# Patient Record
Sex: MALE | Race: BLACK OR AFRICAN AMERICAN | Employment: FULL TIME | URBAN - METROPOLITAN AREA
[De-identification: names, ages, dates, MRNs, and addresses within clinical notes are randomized per-mention and may not be internally consistent; named-entity substitution may affect disease eponyms.]

---

## 2018-01-17 ENCOUNTER — HOSPITAL ENCOUNTER (EMERGENCY)
Age: 48
Discharge: HOME | End: 2018-01-17
Attending: EMERGENCY MEDICINE
Payer: SELF-PAY

## 2018-01-17 VITALS
HEIGHT: 67 IN | OXYGEN SATURATION: 100 % | WEIGHT: 165 LBS | DIASTOLIC BLOOD PRESSURE: 81 MMHG | RESPIRATION RATE: 14 BRPM | TEMPERATURE: 98.3 F | BODY MASS INDEX: 25.9 KG/M2 | SYSTOLIC BLOOD PRESSURE: 173 MMHG | HEART RATE: 91 BPM

## 2018-01-17 DIAGNOSIS — R11.2 NON-INTRACTABLE VOMITING WITH NAUSEA, UNSPECIFIED VOMITING TYPE: Primary | ICD-10-CM

## 2018-01-17 DIAGNOSIS — E87.3 ALKALOSIS: ICD-10-CM

## 2018-01-17 DIAGNOSIS — R73.9 HYPERGLYCEMIA: ICD-10-CM

## 2018-01-17 LAB
ALBUMIN SERPL-MCNC: 3.3 G/DL (ref 3.4–5)
ALBUMIN/GLOB SERPL: 0.9 {RATIO} (ref 0.8–1.7)
ALP SERPL-CCNC: 141 U/L (ref 45–117)
ALT SERPL-CCNC: 22 U/L (ref 16–61)
ANION GAP SERPL CALC-SCNC: 9 MMOL/L (ref 3–18)
AST SERPL-CCNC: 16 U/L (ref 15–37)
BASOPHILS # BLD: 0 K/UL (ref 0–0.06)
BASOPHILS NFR BLD: 1 % (ref 0–2)
BILIRUB SERPL-MCNC: 0.5 MG/DL (ref 0.2–1)
BUN SERPL-MCNC: 30 MG/DL (ref 7–18)
BUN/CREAT SERPL: 16 (ref 12–20)
CALCIUM SERPL-MCNC: 9.1 MG/DL (ref 8.5–10.1)
CHLORIDE SERPL-SCNC: 105 MMOL/L (ref 100–108)
CO2 SERPL-SCNC: 26 MMOL/L (ref 21–32)
CREAT SERPL-MCNC: 1.86 MG/DL (ref 0.6–1.3)
DIFFERENTIAL METHOD BLD: ABNORMAL
EOSINOPHIL # BLD: 0 K/UL (ref 0–0.4)
EOSINOPHIL NFR BLD: 0 % (ref 0–5)
ERYTHROCYTE [DISTWIDTH] IN BLOOD BY AUTOMATED COUNT: 13 % (ref 11.6–14.5)
GLOBULIN SER CALC-MCNC: 3.8 G/DL (ref 2–4)
GLUCOSE SERPL-MCNC: 294 MG/DL (ref 74–99)
HCT VFR BLD AUTO: 38.5 % (ref 36–48)
HGB BLD-MCNC: 13.6 G/DL (ref 13–16)
LIPASE SERPL-CCNC: 105 U/L (ref 73–393)
LYMPHOCYTES # BLD: 1.4 K/UL (ref 0.9–3.6)
LYMPHOCYTES NFR BLD: 25 % (ref 21–52)
MAGNESIUM SERPL-MCNC: 2 MG/DL (ref 1.6–2.6)
MCH RBC QN AUTO: 29.2 PG (ref 24–34)
MCHC RBC AUTO-ENTMCNC: 35.3 G/DL (ref 31–37)
MCV RBC AUTO: 82.6 FL (ref 74–97)
MONOCYTES # BLD: 0.4 K/UL (ref 0.05–1.2)
MONOCYTES NFR BLD: 8 % (ref 3–10)
NEUTS SEG # BLD: 3.7 K/UL (ref 1.8–8)
NEUTS SEG NFR BLD: 66 % (ref 40–73)
PH BLDV: 7.51 [PH] (ref 7.32–7.42)
PLATELET # BLD AUTO: 293 K/UL (ref 135–420)
PMV BLD AUTO: 10.4 FL (ref 9.2–11.8)
POTASSIUM SERPL-SCNC: 4.6 MMOL/L (ref 3.5–5.5)
PROT SERPL-MCNC: 7.1 G/DL (ref 6.4–8.2)
RBC # BLD AUTO: 4.66 M/UL (ref 4.7–5.5)
SODIUM SERPL-SCNC: 140 MMOL/L (ref 136–145)
WBC # BLD AUTO: 5.6 K/UL (ref 4.6–13.2)

## 2018-01-17 PROCEDURE — 99284 EMERGENCY DEPT VISIT MOD MDM: CPT

## 2018-01-17 PROCEDURE — 80053 COMPREHEN METABOLIC PANEL: CPT

## 2018-01-17 PROCEDURE — 96361 HYDRATE IV INFUSION ADD-ON: CPT

## 2018-01-17 PROCEDURE — 83735 ASSAY OF MAGNESIUM: CPT

## 2018-01-17 PROCEDURE — 74011250636 HC RX REV CODE- 250/636: Performed by: EMERGENCY MEDICINE

## 2018-01-17 PROCEDURE — 82800 BLOOD PH: CPT

## 2018-01-17 PROCEDURE — 96374 THER/PROPH/DIAG INJ IV PUSH: CPT

## 2018-01-17 PROCEDURE — 83690 ASSAY OF LIPASE: CPT

## 2018-01-17 PROCEDURE — 85025 COMPLETE CBC W/AUTO DIFF WBC: CPT

## 2018-01-17 RX ORDER — ONDANSETRON 4 MG/1
TABLET, ORALLY DISINTEGRATING ORAL
Qty: 12 TAB | Refills: 0 | Status: SHIPPED | OUTPATIENT
Start: 2018-01-17 | End: 2018-01-18

## 2018-01-17 RX ORDER — ONDANSETRON 2 MG/ML
4 INJECTION INTRAMUSCULAR; INTRAVENOUS
Status: COMPLETED | OUTPATIENT
Start: 2018-01-17 | End: 2018-01-17

## 2018-01-17 RX ORDER — INSULIN GLARGINE 100 [IU]/ML
22 INJECTION, SOLUTION SUBCUTANEOUS
COMMUNITY

## 2018-01-17 RX ORDER — METFORMIN HYDROCHLORIDE 1000 MG/1
1000 TABLET ORAL 2 TIMES DAILY WITH MEALS
COMMUNITY

## 2018-01-17 RX ADMIN — ONDANSETRON 4 MG: 2 INJECTION INTRAMUSCULAR; INTRAVENOUS at 10:58

## 2018-01-17 RX ADMIN — SODIUM CHLORIDE 1000 ML: 900 INJECTION, SOLUTION INTRAVENOUS at 09:48

## 2018-01-17 NOTE — ED PROVIDER NOTES
EMERGENCY DEPARTMENT HISTORY AND PHYSICAL EXAM    9:13 AM      Date: 1/17/2018  Patient Name: Megan Rodriguez    History of Presenting Illness     Chief Complaint   Patient presents with    Vomiting         History Provided By: Patient    Chief Complaint: Vomit  Duration: 2 Days  Timing:  Intermittent  Location: Abdomen  Quality: N/A  Severity: Moderate  Modifying Factors: worse after attempting to eat or drink  Associated Symptoms: fatigue, nausea      Additional History (Context): Megan Rodriguez is a 56 y.o. male with NIDDM, HTN who presents with nausea and vomit x3 days. Pt notes abd soreness from vomiting strain, hyperventilating with vomiting. Last BM 4 days ago, reports decreased BMs due to decreased PO intake. Pt denies fever, unusual or new food, ill-contact, or travel. He notes he hasn't checked his blood sugar in the past few days due to fatigue and the fact that he has not been eating. He also says his last dose of Lantus was 4 days ago and that he is not always compliant with his medications. PCP: None        Past History     Past Medical History:  Past Medical History:   Diagnosis Date    Diabetes [facility])        Past Surgical History:  History reviewed. No pertinent surgical history. Family History:  History reviewed. No pertinent family history. Social History:  Social History   Substance Use Topics    Smoking status: Former Smoker    Smokeless tobacco: Never Used    Alcohol use Yes      Comment: soc       Allergies:  No Known Allergies      Review of Systems       Review of Systems   Constitutional: Positive for chills and fatigue. HENT: Negative for congestion and sore throat. Respiratory: Negative for cough, chest tightness and shortness of breath. Cardiovascular: Negative for chest pain. Gastrointestinal: Positive for abdominal pain, nausea and vomiting. Negative for constipation and diarrhea. Genitourinary: Negative for dysuria and hematuria.    Musculoskeletal: Negative for back pain. Skin: Negative for rash. Neurological: Negative for dizziness and headaches. All other systems reviewed and are negative. Physical Exam     Visit Vitals    /81    Pulse 91    Temp 98.3 °F (36.8 °C)    Resp 14    Ht 5' 7\" (1.702 m)    Wt 74.8 kg (165 lb)    SpO2 100%    BMI 25.84 kg/m2         Physical Exam  General Exam: Patient is a well developed and well nourished in no distress. Patient does not appear acutely ill or toxic. Eye Exam: Lids and conjunctiva are normal  ENT Exam: The general head and facial exam is normal.  The neck is supple without meningeal signs. No significant adenopathy. Pulmonary Exam: No respiratory distress. The respiratory rate is normal.  No stridor. The breath sounds are equal bilaterally. There are no wheezes, rales, or rhonchi noted. Cardiac Exam: The cardiac rate and rhythm are normal.  No significant murmurs, rubs, or gallops. The peripheral pulses are normal.  Abdominal Exam: Abdomen is soft and non-distended. No pulsatile masses. There is no local tenderness. There is no rebound or guarding noted. Skin and Soft Tissue: The skin is warm and dry, without significant abnormality. Good color. Musculoskeletal Exam: No peripheral edema. The musculoskeletal exam of the lower extremities is normal without significant local tenderness. Psychiatric: Normal adult with appropriate demeanor and interpersonal interaction. Is oriented to person, place, and time.       Diagnostic Study Results     Labs -  Recent Results (from the past 12 hour(s))   CBC WITH AUTOMATED DIFF    Collection Time: 01/17/18  9:45 AM   Result Value Ref Range    WBC 5.6 4.6 - 13.2 K/uL    RBC 4.66 (L) 4.70 - 5.50 M/uL    HGB 13.6 13.0 - 16.0 g/dL    HCT 38.5 36.0 - 48.0 %    MCV 82.6 74.0 - 97.0 FL    MCH 29.2 24.0 - 34.0 PG    MCHC 35.3 31.0 - 37.0 g/dL    RDW 13.0 11.6 - 14.5 %    PLATELET 651 604 - 928 K/uL    MPV 10.4 9.2 - 11.8 FL    NEUTROPHILS 66 40 - 73 % LYMPHOCYTES 25 21 - 52 %    MONOCYTES 8 3 - 10 %    EOSINOPHILS 0 0 - 5 %    BASOPHILS 1 0 - 2 %    ABS. NEUTROPHILS 3.7 1.8 - 8.0 K/UL    ABS. LYMPHOCYTES 1.4 0.9 - 3.6 K/UL    ABS. MONOCYTES 0.4 0.05 - 1.2 K/UL    ABS. EOSINOPHILS 0.0 0.0 - 0.4 K/UL    ABS. BASOPHILS 0.0 0.0 - 0.06 K/UL    DF AUTOMATED     METABOLIC PANEL, COMPREHENSIVE    Collection Time: 01/17/18  9:45 AM   Result Value Ref Range    Sodium 140 136 - 145 mmol/L    Potassium 4.6 3.5 - 5.5 mmol/L    Chloride 105 100 - 108 mmol/L    CO2 26 21 - 32 mmol/L    Anion gap 9 3.0 - 18 mmol/L    Glucose 294 (H) 74 - 99 mg/dL    BUN 30 (H) 7.0 - 18 MG/DL    Creatinine 1.86 (H) 0.6 - 1.3 MG/DL    BUN/Creatinine ratio 16 12 - 20      GFR est AA 48 (L) >60 ml/min/1.73m2    GFR est non-AA 39 (L) >60 ml/min/1.73m2    Calcium 9.1 8.5 - 10.1 MG/DL    Bilirubin, total 0.5 0.2 - 1.0 MG/DL    ALT (SGPT) 22 16 - 61 U/L    AST (SGOT) 16 15 - 37 U/L    Alk. phosphatase 141 (H) 45 - 117 U/L    Protein, total 7.1 6.4 - 8.2 g/dL    Albumin 3.3 (L) 3.4 - 5.0 g/dL    Globulin 3.8 2.0 - 4.0 g/dL    A-G Ratio 0.9 0.8 - 1.7     LIPASE    Collection Time: 01/17/18  9:45 AM   Result Value Ref Range    Lipase 105 73 - 393 U/L   PH, VENOUS    Collection Time: 01/17/18  9:45 AM   Result Value Ref Range    VENOUS PH 7.51 (HH) 7.32 - 7.42     MAGNESIUM    Collection Time: 01/17/18  9:45 AM   Result Value Ref Range    Magnesium 2.0 1.6 - 2.6 mg/dL         Medical Decision Making   I am the first provider for this patient. I reviewed the vital signs, available nursing notes, past medical history, past surgical history, family history and social history. Vital Signs-Reviewed the patient's vital signs.     Pulse Oximetry Analysis -  100% on room air (Interpretation) normal    Cardiac Monitor:  Rate: 91    Records Reviewed: Nursing Notes (Time of Review: 9:13 AM)    ED Course: Progress Notes, Reevaluation, and Consults:    10:35 Pt is feeling better, denies nausea, abd remains soft, agrees with withholding imaging at this time. Provider Notes (Medical Decision Making): N/V for several days. Abdomen soft and remains non tenders. Labs with elevated glucose but otherwise reassuring. Pt feeling better with IV fluids and zofran. Mild alkalosis on venous pH - likely combination from reported hyperventilation with vomiting and from the vomiting itself. Counseled on need for improved glycemic control, continued PO hydration, and f/u with PCP. Advised on return precautions. Diagnosis     Clinical Impression:   1. Non-intractable vomiting with nausea, unspecified vomiting type    2. Hyperglycemia    3. Alkalosis        Disposition: Discharge    Follow-up Information     Follow up With Details Comments [address] [facility]  If you need help finding a family doctor. [address] [facility] EMERGENCY DEPT  If symptoms worsen [address]  [phone]           Discharge Medication List as of 1/17/2018 10:46 AM      START taking these medications    Details   ondansetron (ZOFRAN ODT) 4 mg disintegrating tablet Take 1-2 tablets every 6-8 hours as needed for nausea and vomiting., Print, Disp-12 Tab, R-0         CONTINUE these medications which have NOT CHANGED    Details   insulin glargine (LANTUS) 100 unit/mL injection 22 Units by SubCUTAneous route nightly., Historical Med      insulin NPH (HUMULIN N) 100 unit/mL injection by SubCUTAneous route once., Historical Med      OTHER Historical Med      metFORMIN (GLUCOPHAGE) 1,000 mg tablet Take 1,000 mg by mouth two (2) times daily (with meals). , Historical Med           _______________________________    Attestations:  Scribe Attestation     Raf Loomis acting as a scribe for and in the presence of Liam Snyder MD      January 17, 2018 at 8:28 PM       Provider Attestation:      I personally performed the services described in the documentation, reviewed the documentation, as recorded by the scribe in my presence, and it accurately and completely records my words and actions.  January 17, 2018 at 8:28 PM - Dorian Alves MD    _______________________________

## 2018-01-17 NOTE — LETTER
[facility] EMERGENCY DEPT 
[facility] [id number] 
[phone] Work/School Note Date: 1/17/2018 To Whom It May concern: 
 
Emelia Franklin was seen and treated today in the emergency room by the following provider(s): 
Attending Provider: Jacqui King MD.   
 
Emelia Franklin may return to work on 1/18/18. Sincerely, Jacqui King MD

## 2018-01-17 NOTE — DISCHARGE INSTRUCTIONS
Nausea and Vomiting: Care Instructions  Your Care Instructions    When you are nauseated, you may feel weak and sweaty and notice a lot of saliva in your mouth. Nausea often leads to vomiting. Most of the time you do not need to worry about nausea and vomiting, but they can be signs of other illnesses. Two common causes of nausea and vomiting are stomach flu and food poisoning. Nausea and vomiting from viral stomach flu will usually start to improve within 24 hours. Nausea and vomiting from food poisoning may last from 12 to 48 hours. The doctor has checked you carefully, but problems can develop later. If you notice any problems or new symptoms, get medical treatment right away. Follow-up care is a key part of your treatment and safety. Be sure to make and go to all appointments, and call your doctor if you are having problems. It's also a good idea to know your test results and keep a list of the medicines you take. How can you care for yourself at home? · To prevent dehydration, drink plenty of fluids, enough so that your urine is light yellow or clear like water. Choose water and other caffeine-free clear liquids until you feel better. If you have kidney, heart, or liver disease and have to limit fluids, talk with your doctor before you increase the amount of fluids you drink. · Rest in bed until you feel better. · When you are able to eat, try clear soups, mild foods, and liquids until all symptoms are gone for 12 to 48 hours. Other good choices include dry toast, crackers, cooked cereal, and gelatin dessert, such as Jell-O. When should you call for help? Call 911 anytime you think you may need emergency care. For example, call if:  ? · You passed out (lost consciousness). ?Call your doctor now or seek immediate medical care if:  ? · You have symptoms of dehydration, such as:  ¨ Dry eyes and a dry mouth. ¨ Passing only a little dark urine.   ¨ Feeling thirstier than usual.   ? · You have new or worsening belly pain. ? · You have a new or higher fever. ? · You vomit blood or what looks like coffee grounds. ? Watch closely for changes in your health, and be sure to contact your doctor if:  ? · You have ongoing nausea and vomiting. ? · Your vomiting is getting worse. ? · Your vomiting lasts longer than 2 days. ? · You are not getting better as expected. Where can you learn more? Go to http://john-harlan.info/. Enter 25 [id number] in the search box to learn more about \"Nausea and Vomiting: Care Instructions. \"  Current as of: March 20, 2017  Content Version: 11.4  © 5244-8736 [employer]. Care instructions adapted under license by [employer] (which disclaims liability or warranty for this information). If you have questions about a medical condition or this instruction, always ask your healthcare professional. [place] [address] any warranty or liability for your use of this information. Learning About High Blood Sugar  What is high blood sugar? Your body turns the food you eat into glucose (sugar), which it uses for energy. But if your body isn't able to use the sugar right away, it can build up in your blood and lead to high blood sugar. When the amount of sugar in your blood stays too high for too much of the time, you may have diabetes. Diabetes is a disease that can cause serious health problems. The good news is that lifestyle changes may help you get your blood sugar back to normal and avoid or delay diabetes. What causes high blood sugar? Sugar (glucose) can build up in your blood if you:  · Are overweight. · Have a family history of diabetes. · Take certain medicines, such as steroids. What are the symptoms? Having high blood sugar may not cause any symptoms at all. Or it may make you feel very thirsty or very hungry. You may also urinate more often than usual, have blurry vision, or lose weight without trying.   How is high blood sugar treated? You can take steps to lower your blood sugar level if you understand what makes it get higher. Your doctor may want you to learn how to test your blood sugar level at home. Then you can see how illness, stress, or different kinds of food or medicine raise or lower your blood sugar level. Other tests may be needed to see if you have diabetes. How can you prevent high blood sugar? · Watch your weight. If you're overweight, losing just a small amount of weight may help. Reducing fat around your waist is most important. · Limit the amount of calories, sweets, and unhealthy fat you eat. Ask your doctor if a dietitian can help you. A registered dietitian can help you create meal plans that fit your lifestyle. · Get at least 30 minutes of exercise on most days of the week. Exercise helps control your blood sugar. It also helps you maintain a healthy weight. Walking is a good choice. You also may want to do other activities, such as running, swimming, cycling, or playing tennis or team sports. · If your doctor prescribed medicines, take them exactly as prescribed. Call your doctor if you think you are having a problem with your medicine. You will get more details on the specific medicines your doctor prescribes. Follow-up care is a key part of your treatment and safety. Be sure to make and go to all appointments, and call your doctor if you are having problems. It's also a good idea to know your test results and keep a list of the medicines you take. Where can you learn more? Go to http://john-harlan.info/. Enter O108 in the search box to learn more about \"Learning About High Blood Sugar. \"  Current as of: March 13, 2017  Content Version: 11.4  © 0064-7854 [employer]. Care instructions adapted under license by [employer] (which disclaims liability or warranty for this information).  If you have questions about a medical condition or this instruction, always ask your healthcare professional. [place] [address] any warranty or liability for your use of this information.

## 2018-01-17 NOTE — ED NOTES
Patient stated understanding of discharge instructions. Patient was ambulatory upon discharge. Patient received one prescription.     Patient armband removed and shredded    Work note given

## 2018-01-18 ENCOUNTER — APPOINTMENT (OUTPATIENT)
Dept: CT IMAGING | Age: 48
End: 2018-01-18
Attending: PHYSICIAN ASSISTANT
Payer: SELF-PAY

## 2018-01-18 ENCOUNTER — APPOINTMENT (OUTPATIENT)
Dept: GENERAL RADIOLOGY | Age: 48
End: 2018-01-18
Attending: PHYSICIAN ASSISTANT
Payer: SELF-PAY

## 2018-01-18 ENCOUNTER — HOSPITAL ENCOUNTER (EMERGENCY)
Age: 48
Discharge: HOME | End: 2018-01-18
Attending: EMERGENCY MEDICINE
Payer: SELF-PAY

## 2018-01-18 VITALS
SYSTOLIC BLOOD PRESSURE: 194 MMHG | HEART RATE: 93 BPM | RESPIRATION RATE: 13 BRPM | OXYGEN SATURATION: 100 % | DIASTOLIC BLOOD PRESSURE: 86 MMHG | TEMPERATURE: 99 F

## 2018-01-18 DIAGNOSIS — R11.2 NON-INTRACTABLE VOMITING WITH NAUSEA, UNSPECIFIED VOMITING TYPE: Primary | ICD-10-CM

## 2018-01-18 DIAGNOSIS — R10.13 ABDOMINAL PAIN, EPIGASTRIC: ICD-10-CM

## 2018-01-18 LAB
ALBUMIN SERPL-MCNC: 3.1 G/DL (ref 3.4–5)
ALBUMIN/GLOB SERPL: 0.8 {RATIO} (ref 0.8–1.7)
ALP SERPL-CCNC: 131 U/L (ref 45–117)
ALT SERPL-CCNC: 23 U/L (ref 16–61)
ANION GAP SERPL CALC-SCNC: 13 MMOL/L (ref 3–18)
APPEARANCE UR: CLEAR
ARTERIAL PATENCY WRIST A: ABNORMAL
AST SERPL-CCNC: 15 U/L (ref 15–37)
BACTERIA URNS QL MICRO: ABNORMAL /HPF
BASE DEFICIT BLDV-SCNC: 4 MMOL/L
BASOPHILS # BLD: 0 K/UL (ref 0–0.06)
BASOPHILS NFR BLD: 0 % (ref 0–2)
BDY SITE: ABNORMAL
BILIRUB SERPL-MCNC: 0.6 MG/DL (ref 0.2–1)
BILIRUB UR QL: NEGATIVE
BODY TEMPERATURE: 98.7
BUN SERPL-MCNC: 30 MG/DL (ref 7–18)
BUN/CREAT SERPL: 17 (ref 12–20)
CALCIUM SERPL-MCNC: 9 MG/DL (ref 8.5–10.1)
CHLORIDE SERPL-SCNC: 103 MMOL/L (ref 100–108)
CO2 SERPL-SCNC: 23 MMOL/L (ref 21–32)
COLOR UR: YELLOW
CREAT SERPL-MCNC: 1.77 MG/DL (ref 0.6–1.3)
DIFFERENTIAL METHOD BLD: ABNORMAL
EOSINOPHIL # BLD: 0 K/UL (ref 0–0.4)
EOSINOPHIL NFR BLD: 0 % (ref 0–5)
EPITH CASTS URNS QL MICRO: ABNORMAL /LPF (ref 0–5)
ERYTHROCYTE [DISTWIDTH] IN BLOOD BY AUTOMATED COUNT: 12.7 % (ref 11.6–14.5)
GAS FLOW.O2 O2 DELIVERY SYS: ABNORMAL L/MIN
GLOBULIN SER CALC-MCNC: 3.8 G/DL (ref 2–4)
GLUCOSE SERPL-MCNC: 244 MG/DL (ref 74–99)
GLUCOSE UR STRIP.AUTO-MCNC: >1000 MG/DL
HCO3 BLDV-SCNC: 21.7 MMOL/L (ref 23–28)
HCT VFR BLD AUTO: 37.7 % (ref 36–48)
HGB BLD-MCNC: 13.1 G/DL (ref 13–16)
HGB UR QL STRIP: ABNORMAL
HYALINE CASTS URNS QL MICRO: ABNORMAL /LPF (ref 0–2)
KETONES UR QL STRIP.AUTO: 15 MG/DL
LEUKOCYTE ESTERASE UR QL STRIP.AUTO: NEGATIVE
LIPASE SERPL-CCNC: 68 U/L (ref 73–393)
LYMPHOCYTES # BLD: 1.7 K/UL (ref 0.9–3.6)
LYMPHOCYTES NFR BLD: 23 % (ref 21–52)
MCH RBC QN AUTO: 28.5 PG (ref 24–34)
MCHC RBC AUTO-ENTMCNC: 34.7 G/DL (ref 31–37)
MCV RBC AUTO: 82.1 FL (ref 74–97)
MONOCYTES # BLD: 0.3 K/UL (ref 0.05–1.2)
MONOCYTES NFR BLD: 4 % (ref 3–10)
MUCOUS THREADS URNS QL MICRO: ABNORMAL /LPF
NEUTS SEG # BLD: 5.3 K/UL (ref 1.8–8)
NEUTS SEG NFR BLD: 73 % (ref 40–73)
NITRITE UR QL STRIP.AUTO: NEGATIVE
O2/TOTAL GAS SETTING VFR VENT: 0.21 %
PCO2 BLDV: 39 MMHG (ref 41–51)
PH BLDV: 7.35 [PH] (ref 7.32–7.42)
PH BLDV: 7.42 [PH] (ref 7.32–7.42)
PH UR STRIP: 5 [PH] (ref 5–8)
PLATELET # BLD AUTO: 295 K/UL (ref 135–420)
PMV BLD AUTO: 10 FL (ref 9.2–11.8)
PO2 BLDV: 41 MMHG (ref 25–40)
POTASSIUM SERPL-SCNC: 4 MMOL/L (ref 3.5–5.5)
PROT SERPL-MCNC: 6.9 G/DL (ref 6.4–8.2)
PROT UR STRIP-MCNC: 300 MG/DL
RBC # BLD AUTO: 4.59 M/UL (ref 4.7–5.5)
RBC #/AREA URNS HPF: ABNORMAL /HPF (ref 0–5)
SAO2 % BLDV: 73 % (ref 65–88)
SERVICE CMNT-IMP: ABNORMAL
SODIUM SERPL-SCNC: 139 MMOL/L (ref 136–145)
SP GR UR REFRACTOMETRY: 1.02 (ref 1–1.03)
SPECIMEN TYPE: ABNORMAL
TOTAL RESP. RATE, ITRR: 15
UROBILINOGEN UR QL STRIP.AUTO: 0.2 EU/DL (ref 0.2–1)
WBC # BLD AUTO: 7.3 K/UL (ref 4.6–13.2)
WBC URNS QL MICRO: ABNORMAL /HPF (ref 0–4)

## 2018-01-18 PROCEDURE — 81001 URINALYSIS AUTO W/SCOPE: CPT | Performed by: PHYSICIAN ASSISTANT

## 2018-01-18 PROCEDURE — 74011636637 HC RX REV CODE- 636/637: Performed by: PHYSICIAN ASSISTANT

## 2018-01-18 PROCEDURE — 85025 COMPLETE CBC W/AUTO DIFF WBC: CPT | Performed by: PHYSICIAN ASSISTANT

## 2018-01-18 PROCEDURE — 83690 ASSAY OF LIPASE: CPT | Performed by: PHYSICIAN ASSISTANT

## 2018-01-18 PROCEDURE — 96374 THER/PROPH/DIAG INJ IV PUSH: CPT

## 2018-01-18 PROCEDURE — 74177 CT ABD & PELVIS W/CONTRAST: CPT

## 2018-01-18 PROCEDURE — 82803 BLOOD GASES ANY COMBINATION: CPT

## 2018-01-18 PROCEDURE — 71045 X-RAY EXAM CHEST 1 VIEW: CPT

## 2018-01-18 PROCEDURE — 80053 COMPREHEN METABOLIC PANEL: CPT | Performed by: PHYSICIAN ASSISTANT

## 2018-01-18 PROCEDURE — 82800 BLOOD PH: CPT | Performed by: PHYSICIAN ASSISTANT

## 2018-01-18 PROCEDURE — 74011250636 HC RX REV CODE- 250/636: Performed by: PHYSICIAN ASSISTANT

## 2018-01-18 PROCEDURE — 96361 HYDRATE IV INFUSION ADD-ON: CPT

## 2018-01-18 PROCEDURE — 74011636320 HC RX REV CODE- 636/320: Performed by: EMERGENCY MEDICINE

## 2018-01-18 PROCEDURE — 99285 EMERGENCY DEPT VISIT HI MDM: CPT

## 2018-01-18 PROCEDURE — 96375 TX/PRO/DX INJ NEW DRUG ADDON: CPT

## 2018-01-18 RX ORDER — ONDANSETRON 2 MG/ML
4 INJECTION INTRAMUSCULAR; INTRAVENOUS
Status: COMPLETED | OUTPATIENT
Start: 2018-01-18 | End: 2018-01-18

## 2018-01-18 RX ORDER — ONDANSETRON 4 MG/1
TABLET, ORALLY DISINTEGRATING ORAL
Qty: 15 TAB | Refills: 0 | Status: SHIPPED | OUTPATIENT
Start: 2018-01-18

## 2018-01-18 RX ORDER — PROCHLORPERAZINE EDISYLATE 5 MG/ML
10 INJECTION INTRAMUSCULAR; INTRAVENOUS
Status: DISCONTINUED | OUTPATIENT
Start: 2018-01-18 | End: 2018-01-18 | Stop reason: HOSPADM

## 2018-01-18 RX ORDER — DIPHENHYDRAMINE HYDROCHLORIDE 50 MG/ML
25 INJECTION, SOLUTION INTRAMUSCULAR; INTRAVENOUS ONCE
Status: COMPLETED | OUTPATIENT
Start: 2018-01-18 | End: 2018-01-18

## 2018-01-18 RX ORDER — MORPHINE SULFATE 10 MG/ML
4 INJECTION, SOLUTION INTRAMUSCULAR; INTRAVENOUS
Status: COMPLETED | OUTPATIENT
Start: 2018-01-18 | End: 2018-01-18

## 2018-01-18 RX ADMIN — HUMAN INSULIN 8 UNITS: 100 INJECTION, SOLUTION SUBCUTANEOUS at 16:15

## 2018-01-18 RX ADMIN — PROCHLORPERAZINE EDISYLATE 10 MG: 5 INJECTION INTRAMUSCULAR; INTRAVENOUS at 15:35

## 2018-01-18 RX ADMIN — MORPHINE SULFATE 4 MG: 10 INJECTION INTRAMUSCULAR; INTRAVENOUS; SUBCUTANEOUS at 13:59

## 2018-01-18 RX ADMIN — SODIUM CHLORIDE 1000 ML: 0.9 INJECTION, SOLUTION INTRAVENOUS at 13:59

## 2018-01-18 RX ADMIN — ONDANSETRON 4 MG: 2 INJECTION INTRAMUSCULAR; INTRAVENOUS at 13:59

## 2018-01-18 RX ADMIN — IOPAMIDOL 71 ML: 612 INJECTION, SOLUTION INTRAVENOUS at 15:00

## 2018-01-18 RX ADMIN — DIPHENHYDRAMINE HYDROCHLORIDE 25 MG: 50 INJECTION, SOLUTION INTRAMUSCULAR; INTRAVENOUS at 15:35

## 2018-01-18 NOTE — LETTER
[facility] EMERGENCY DEPT 
[facility] [phone] 
[phone] Work/School Note Date: 1/18/2018 To Whom It May concern: 
 
Emelia Franklin was seen and treated today in the emergency room by the following provider(s): 
Attending Provider: Jacqui King MD 
Physician Assistant: Sam Owens PA-C. Emelia Franklin may return to work/school on 1/19/18. Sincerely, Dante Morales RN

## 2018-01-18 NOTE — ED NOTES
I have reviewed discharge instructions with the patient. The patient verbalized understanding. Patient armband removed and given to patient to take home. Patient was informed of the privacy risks if armband lost or stolen. Pt discharged in NAD.

## 2018-01-18 NOTE — ED PROVIDER NOTES
EMERGENCY DEPARTMENT HISTORY AND PHYSICAL EXAM    1:39 PM      Date: 1/18/2018  Patient Name: Mary Gallardo    History of Presenting Illness     Chief Complaint   Patient presents with    Nausea    Vomiting         History Provided By: Patient    Chief Complaint: \"I can't keep anything down\"  Duration:  Days  Timing:  Constant  Location: abd  Quality: Cramping  Severity: Severe  Modifying Factors: none  Associated Symptoms: nausea, vomiting, abd pain      Additional History (Context): Mary Gallardo is a 56 y.o. male with IDDM who presents with nausea and vomiting x 5 days. Seen here yesterday, given fluids and zofran with good response, given zofran rx but did not fill it. Also notes epigastric pain. States urinating less than normal.  No recent BM, states thinks this is from decreased intake. Denies cough, congestion, SOB, CP, fever. No treatments tried. PCP: None    Current Facility-Administered Medications   Medication Dose Route Frequency Provider Last Rate Last Dose    prochlorperazine (COMPAZINE) injection 10 mg  10 mg IntraVENous Q6H PRN Clementina Sandoval PA-C   10 mg at 01/18/18 3035     Current Outpatient Prescriptions   Medication Sig Dispense Refill    insulin glargine (LANTUS) 100 unit/mL injection 22 Units by SubCUTAneous route nightly.  insulin NPH (HUMULIN N) 100 unit/mL injection by SubCUTAneous route once.  OTHER       metFORMIN (GLUCOPHAGE) 1,000 mg tablet Take 1,000 mg by mouth two (2) times daily (with meals).  ondansetron (ZOFRAN ODT) 4 mg disintegrating tablet Take 1-2 tablets every 6-8 hours as needed for nausea and vomiting. 12 Tab 0       Past History     Past Medical History:  Past Medical History:   Diagnosis Date    Diabetes [facility])        Past Surgical History:  History reviewed. No pertinent surgical history. Family History:  History reviewed. No pertinent family history.     Social History:  Social History   Substance Use Topics    Smoking status: Former Smoker    Smokeless tobacco: Never Used    Alcohol use Yes      Comment: soc       Allergies:  No Known Allergies      Review of Systems     Review of Systems   Constitutional: Positive for appetite change. Negative for activity change and fever. HENT: Negative for congestion, rhinorrhea and sore throat. Respiratory: Negative for cough and shortness of breath. Cardiovascular: Negative for chest pain. Gastrointestinal: Positive for abdominal pain, nausea and vomiting. Negative for abdominal distention, anal bleeding, blood in stool, constipation, diarrhea and rectal pain. Genitourinary: Negative for frequency and urgency. Musculoskeletal: Negative for arthralgias and myalgias. Skin: Negative for pallor and rash. All other systems reviewed and are negative. Physical Exam     Visit Vitals    /86    Pulse 93    Temp 99 °F (37.2 °C)    Resp 13    SpO2 100%       Physical Exam   Constitutional: Vital signs are normal. He appears well-developed and well-nourished. He is cooperative. Non-toxic appearance. He does not have a sickly appearance. He does not appear ill. He appears distressed (appears moderately distressed and in pain). Eyes: Conjunctivae and lids are normal. No scleral icterus. Cardiovascular: Normal rate, regular rhythm and normal heart sounds. Pulmonary/Chest: Effort normal and breath sounds normal.   Abdominal: Soft. Normal appearance and bowel sounds are normal. There is tenderness (severe, pt in tears; at repeat abd exam, mild epigastric TTP) in the epigastric area. There is guarding. There is no rigidity, no rebound and no CVA tenderness. Neurological: He is alert. Skin: Skin is warm and dry. Nursing note and vitals reviewed.         Diagnostic Study Results     Labs -  Recent Results (from the past 12 hour(s))   CBC WITH AUTOMATED DIFF    Collection Time: 01/18/18  2:00 PM   Result Value Ref Range    WBC 7.3 4.6 - 13.2 K/uL    RBC 4.59 (L) 4.70 - 5.50 M/uL HGB 13.1 13.0 - 16.0 g/dL    HCT 37.7 36.0 - 48.0 %    MCV 82.1 74.0 - 97.0 FL    MCH 28.5 24.0 - 34.0 PG    MCHC 34.7 31.0 - 37.0 g/dL    RDW 12.7 11.6 - 14.5 %    PLATELET 491 714 - 291 K/uL    MPV 10.0 9.2 - 11.8 FL    NEUTROPHILS 73 40 - 73 %    LYMPHOCYTES 23 21 - 52 %    MONOCYTES 4 3 - 10 %    EOSINOPHILS 0 0 - 5 %    BASOPHILS 0 0 - 2 %    ABS. NEUTROPHILS 5.3 1.8 - 8.0 K/UL    ABS. LYMPHOCYTES 1.7 0.9 - 3.6 K/UL    ABS. MONOCYTES 0.3 0.05 - 1.2 K/UL    ABS. EOSINOPHILS 0.0 0.0 - 0.4 K/UL    ABS. BASOPHILS 0.0 0.0 - 0.06 K/UL    DF AUTOMATED     METABOLIC PANEL, COMPREHENSIVE    Collection Time: 01/18/18  2:00 PM   Result Value Ref Range    Sodium 139 136 - 145 mmol/L    Potassium 4.0 3.5 - 5.5 mmol/L    Chloride 103 100 - 108 mmol/L    CO2 23 21 - 32 mmol/L    Anion gap 13 3.0 - 18 mmol/L    Glucose 244 (H) 74 - 99 mg/dL    BUN 30 (H) 7.0 - 18 MG/DL    Creatinine 1.77 (H) 0.6 - 1.3 MG/DL    BUN/Creatinine ratio 17 12 - 20      GFR est AA 50 (L) >60 ml/min/1.73m2    GFR est non-AA 42 (L) >60 ml/min/1.73m2    Calcium 9.0 8.5 - 10.1 MG/DL    Bilirubin, total 0.6 0.2 - 1.0 MG/DL    ALT (SGPT) 23 16 - 61 U/L    AST (SGOT) 15 15 - 37 U/L    Alk.  phosphatase 131 (H) 45 - 117 U/L    Protein, total 6.9 6.4 - 8.2 g/dL    Albumin 3.1 (L) 3.4 - 5.0 g/dL    Globulin 3.8 2.0 - 4.0 g/dL    A-G Ratio 0.8 0.8 - 1.7     LIPASE    Collection Time: 01/18/18  2:00 PM   Result Value Ref Range    Lipase 68 (L) 73 - 393 U/L   PH, VENOUS    Collection Time: 01/18/18  2:00 PM   Result Value Ref Range    VENOUS PH 7.42 7.32 - 7.42     URINALYSIS W/ RFLX MICROSCOPIC    Collection Time: 01/18/18  3:00 PM   Result Value Ref Range    Color YELLOW      Appearance CLEAR      Specific gravity 1.022 1.005 - 1.030      pH (UA) 5.0 5.0 - 8.0      Protein 300 (A) NEG mg/dL    Glucose >1000 (A) NEG mg/dL    Ketone 15 (A) NEG mg/dL    Bilirubin NEGATIVE  NEG      Blood MODERATE (A) NEG      Urobilinogen 0.2 0.2 - 1.0 EU/dL    Nitrites NEGATIVE NEG      Leukocyte Esterase NEGATIVE  NEG     URINE MICROSCOPIC ONLY    Collection Time: 01/18/18  3:00 PM   Result Value Ref Range    WBC 0 to 3 0 - 4 /hpf    RBC 0 to 3 0 - 5 /hpf    Epithelial cells 1+ 0 - 5 /lpf    Bacteria 1+ (A) NEG /hpf    Mucus 1+ (A) NEG /lpf    Hyaline cast 0 to 3 0 - 2 /lpf   POC VENOUS BLOOD GAS    Collection Time: 01/18/18  4:36 PM   Result Value Ref Range    Device: ROOM AIR      FIO2 (POC) 0.21 %    pH, venous (POC) 7.353 7.32 - 7.42      pCO2, venous (POC) 39.0 (L) 41 - 51 MMHG    pO2, venous (POC) 41 (H) 25 - 40 mmHg    HCO3, venous (POC) 21.7 (L) 23.0 - 28.0 MMOL/L    sO2, venous (POC) 73 65 - 88 %    Base deficit, venous (POC) 4 mmol/L    Allens test (POC) N/A      Total resp. rate 15      Site OTHER      Patient temp. 98.7      Specimen type (POC) VENOUS BLOOD      Performed by Shadia Laguna        Radiologic Studies -   XR CHEST PORT   Final Result      CT ABD PELV W CONT   Final Result            Medical Decision Making   I am the first provider for this patient. I reviewed the vital signs, available nursing notes, past medical history, past surgical history, family history and social history. Vital Signs-Reviewed the patient's vital signs. Pulse Oximetry Analysis -  100% on room air    Records Reviewed: Nursing Notes and Old Medical Records (Time of Review: 1:39 PM)    ED Course: Progress Notes, Reevaluation, and Consults: At reeval, pt appears much better, states nausea controlled, able to tolerate PO intake without vomiting, abd pain much improved. D/w Dr Jordyn Pineda who saw pt yesterday, states does not appear worse than yesterday, ok for d/c.     Provider Notes (Medical Decision Making): Joanne Cordoba with pmhx IDDM presenting with N/V, abd pain. Zofran, compazine, benadryl, IVF given; pt states pain and nausea controlled. CBC WNL without anemia or leukocytosis. CMP with glucose 244 (8 units insulin given), BUN/CR 30/1.77 slightly improved compared to yesterday. Lipase WNL. Venous pH 7.42, improved from 7.55 yesterday. UA with 15 ketones, 300 protein. CT abd/pelvis WNL, CXR negative. Pt did not fill zofran yesterday for control of sx's. At time of discharge, pain and nausea controlled, pt well-appearing. Encouraged to fill rx for nausea, reiterated importance of fluid intake. GI f/u info provided. Pt voiced understanding and agrees with plan. Diagnosis     Clinical Impression:   1. Non-intractable vomiting with nausea, unspecified vomiting type    2.  Abdominal pain, epigastric      _______________________________

## 2018-01-18 NOTE — DISCHARGE INSTRUCTIONS
Abdominal Pain: Care Instructions  Your Care Instructions    Abdominal pain has many possible causes. Some aren't serious and get better on their own in a few days. Others need more testing and treatment. If your pain continues or gets worse, you need to be rechecked and may need more tests to find out what is wrong. You may need surgery to correct the problem. Don't ignore new symptoms, such as fever, nausea and vomiting, urination problems, pain that gets worse, and dizziness. These may be signs of a more serious problem. Your doctor may have recommended a follow-up visit in the next 8 to 12 hours. If you are not getting better, you may need more tests or treatment. The doctor has checked you carefully, but problems can develop later. If you notice any problems or new symptoms, get medical treatment right away. Follow-up care is a key part of your treatment and safety. Be sure to make and go to all appointments, and call your doctor if you are having problems. It's also a good idea to know your test results and keep a list of the medicines you take. How can you care for yourself at home? · Rest until you feel better. · To prevent dehydration, drink plenty of fluids, enough so that your urine is light yellow or clear like water. Choose water and other caffeine-free clear liquids until you feel better. If you have kidney, heart, or liver disease and have to limit fluids, talk with your doctor before you increase the amount of fluids you drink. · If your stomach is upset, eat mild foods, such as rice, dry toast or crackers, bananas, and applesauce. Try eating several small meals instead of two or three large ones. · Wait until 48 hours after all symptoms have gone away before you have spicy foods, alcohol, and drinks that contain caffeine. · Do not eat foods that are high in fat. · Avoid anti-inflammatory medicines such as aspirin, ibuprofen (Advil, Motrin), and naproxen (Aleve).  These can cause stomach upset. Talk to your doctor if you take daily aspirin for another health problem. When should you call for help? Call 911 anytime you think you may need emergency care. For example, call if:  ? · You passed out (lost consciousness). ? · You pass maroon or very bloody stools. ? · You vomit blood or what looks like coffee grounds. ? · You have new, severe belly pain. ?Call your doctor now or seek immediate medical care if:  ? · Your pain gets worse, especially if it becomes focused in one area of your belly. ? · You have a new or higher fever. ? · Your stools are black and look like tar, or they have streaks of blood. ? · You have unexpected vaginal bleeding. ? · You have symptoms of a urinary tract infection. These may include:  ¨ Pain when you urinate. ¨ Urinating more often than usual.  ¨ Blood in your urine. ? · You are dizzy or lightheaded, or you feel like you may faint. ? Watch closely for changes in your health, and be sure to contact your doctor if:  ? · You are not getting better after 1 day (24 hours). Where can you learn more? Go to http://john-harlan.info/. Enter V370 in the search box to learn more about \"Abdominal Pain: Care Instructions. \"  Current as of: March 20, 2017  Content Version: 11.4  © 9271-2633 [employer]. Care instructions adapted under license by [employer] (which disclaims liability or warranty for this information). If you have questions about a medical condition or this instruction, always ask your healthcare professional. [place] [address] any warranty or liability for your use of this information. Nausea and Vomiting: Care Instructions  Your Care Instructions    When you are nauseated, you may feel weak and sweaty and notice a lot of saliva in your mouth. Nausea often leads to vomiting.  Most of the time you do not need to worry about nausea and vomiting, but they can be signs of other illnesses. Two common causes of nausea and vomiting are stomach flu and food poisoning. Nausea and vomiting from viral stomach flu will usually start to improve within 24 hours. Nausea and vomiting from food poisoning may last from 12 to 48 hours. The doctor has checked you carefully, but problems can develop later. If you notice any problems or new symptoms, get medical treatment right away. Follow-up care is a key part of your treatment and safety. Be sure to make and go to all appointments, and call your doctor if you are having problems. It's also a good idea to know your test results and keep a list of the medicines you take. How can you care for yourself at home? · To prevent dehydration, drink plenty of fluids, enough so that your urine is light yellow or clear like water. Choose water and other caffeine-free clear liquids until you feel better. If you have kidney, heart, or liver disease and have to limit fluids, talk with your doctor before you increase the amount of fluids you drink. · Rest in bed until you feel better. · When you are able to eat, try clear soups, mild foods, and liquids until all symptoms are gone for 12 to 48 hours. Other good choices include dry toast, crackers, cooked cereal, and gelatin dessert, such as Jell-O. When should you call for help? Call 911 anytime you think you may need emergency care. For example, call if:  ? · You passed out (lost consciousness). ?Call your doctor now or seek immediate medical care if:  ? · You have symptoms of dehydration, such as:  ¨ Dry eyes and a dry mouth. ¨ Passing only a little dark urine. ¨ Feeling thirstier than usual.   ? · You have new or worsening belly pain. ? · You have a new or higher fever. ? · You vomit blood or what looks like coffee grounds. ? Watch closely for changes in your health, and be sure to contact your doctor if:  ? · You have ongoing nausea and vomiting. ? · Your vomiting is getting worse.    ? · Your vomiting lasts longer than 2 days. ? · You are not getting better as expected. Where can you learn more? Go to http://john-harlan.info/. Enter 25 [id number] in the search box to learn more about \"Nausea and Vomiting: Care Instructions. \"  Current as of: March 20, 2017  Content Version: 11.4  © 4060-9527 [employer]. Care instructions adapted under license by [employer] (which disclaims liability or warranty for this information). If you have questions about a medical condition or this instruction, always ask your healthcare professional. [place] [address] any warranty or liability for your use of this information.

## 2018-11-05 ENCOUNTER — HOSPITAL ENCOUNTER (EMERGENCY)
Age: 48
Discharge: HOME | End: 2018-11-05
Attending: EMERGENCY MEDICINE | Admitting: EMERGENCY MEDICINE
Payer: SELF-PAY

## 2018-11-05 ENCOUNTER — APPOINTMENT (OUTPATIENT)
Dept: GENERAL RADIOLOGY | Age: 48
End: 2018-11-05
Attending: EMERGENCY MEDICINE
Payer: SELF-PAY

## 2018-11-05 VITALS
HEART RATE: 104 BPM | WEIGHT: 175 LBS | SYSTOLIC BLOOD PRESSURE: 182 MMHG | OXYGEN SATURATION: 100 % | DIASTOLIC BLOOD PRESSURE: 99 MMHG | BODY MASS INDEX: 27.47 KG/M2 | TEMPERATURE: 99.5 F | HEIGHT: 67 IN | RESPIRATION RATE: 16 BRPM

## 2018-11-05 DIAGNOSIS — S20.212A RIB CONTUSION, LEFT, INITIAL ENCOUNTER: Primary | ICD-10-CM

## 2018-11-05 DIAGNOSIS — N17.9 AKI (ACUTE KIDNEY INJURY) (HCC): ICD-10-CM

## 2018-11-05 DIAGNOSIS — R73.9 HYPERGLYCEMIA: ICD-10-CM

## 2018-11-05 LAB
ALBUMIN SERPL-MCNC: 3.3 G/DL (ref 3.4–5)
ALBUMIN/GLOB SERPL: 0.9 {RATIO} (ref 0.8–1.7)
ALP SERPL-CCNC: 135 U/L (ref 45–117)
ALT SERPL-CCNC: 27 U/L (ref 16–61)
ANION GAP SERPL CALC-SCNC: 13 MMOL/L (ref 3–18)
AST SERPL-CCNC: 20 U/L (ref 15–37)
ATRIAL RATE: 104 BPM
BASOPHILS # BLD: 0 K/UL (ref 0–0.1)
BASOPHILS NFR BLD: 0 % (ref 0–2)
BILIRUB SERPL-MCNC: 0.6 MG/DL (ref 0.2–1)
BNP SERPL-MCNC: 973 PG/ML (ref 0–450)
BUN SERPL-MCNC: 34 MG/DL (ref 7–18)
BUN/CREAT SERPL: 15 (ref 12–20)
CALCIUM SERPL-MCNC: 9.4 MG/DL (ref 8.5–10.1)
CALCULATED P AXIS, ECG09: 75 DEGREES
CALCULATED R AXIS, ECG10: 12 DEGREES
CALCULATED T AXIS, ECG11: 54 DEGREES
CHLORIDE SERPL-SCNC: 109 MMOL/L (ref 100–108)
CO2 SERPL-SCNC: 20 MMOL/L (ref 21–32)
CREAT SERPL-MCNC: 2.34 MG/DL (ref 0.6–1.3)
DIAGNOSIS, 93000: NORMAL
DIFFERENTIAL METHOD BLD: ABNORMAL
EOSINOPHIL # BLD: 0 K/UL (ref 0–0.4)
EOSINOPHIL NFR BLD: 0 % (ref 0–5)
ERYTHROCYTE [DISTWIDTH] IN BLOOD BY AUTOMATED COUNT: 13.1 % (ref 11.6–14.5)
GLOBULIN SER CALC-MCNC: 3.7 G/DL (ref 2–4)
GLUCOSE BLD STRIP.AUTO-MCNC: 430 MG/DL (ref 70–110)
GLUCOSE SERPL-MCNC: 431 MG/DL (ref 74–99)
HCT VFR BLD AUTO: 39 % (ref 36–48)
HGB BLD-MCNC: 13.5 G/DL (ref 13–16)
LYMPHOCYTES # BLD: 1.4 K/UL (ref 0.9–3.6)
LYMPHOCYTES NFR BLD: 14 % (ref 21–52)
MCH RBC QN AUTO: 28.6 PG (ref 24–34)
MCHC RBC AUTO-ENTMCNC: 34.6 G/DL (ref 31–37)
MCV RBC AUTO: 82.6 FL (ref 74–97)
MONOCYTES # BLD: 0.4 K/UL (ref 0.05–1.2)
MONOCYTES NFR BLD: 4 % (ref 3–10)
NEUTS SEG # BLD: 7.9 K/UL (ref 1.8–8)
NEUTS SEG NFR BLD: 82 % (ref 40–73)
P-R INTERVAL, ECG05: 120 MS
PLATELET # BLD AUTO: 319 K/UL (ref 135–420)
PMV BLD AUTO: 10.7 FL (ref 9.2–11.8)
POTASSIUM SERPL-SCNC: 5.1 MMOL/L (ref 3.5–5.5)
PROT SERPL-MCNC: 7 G/DL (ref 6.4–8.2)
Q-T INTERVAL, ECG07: 342 MS
QRS DURATION, ECG06: 80 MS
QTC CALCULATION (BEZET), ECG08: 449 MS
RBC # BLD AUTO: 4.72 M/UL (ref 4.7–5.5)
SODIUM SERPL-SCNC: 142 MMOL/L (ref 136–145)
TROPONIN I SERPL-MCNC: 0.03 NG/ML (ref 0–0.04)
VENTRICULAR RATE, ECG03: 104 BPM
WBC # BLD AUTO: 9.7 K/UL (ref 4.6–13.2)

## 2018-11-05 PROCEDURE — 93005 ELECTROCARDIOGRAM TRACING: CPT

## 2018-11-05 PROCEDURE — 80053 COMPREHEN METABOLIC PANEL: CPT

## 2018-11-05 PROCEDURE — 74011636637 HC RX REV CODE- 636/637: Performed by: EMERGENCY MEDICINE

## 2018-11-05 PROCEDURE — 85025 COMPLETE CBC W/AUTO DIFF WBC: CPT

## 2018-11-05 PROCEDURE — 74011250637 HC RX REV CODE- 250/637: Performed by: EMERGENCY MEDICINE

## 2018-11-05 PROCEDURE — 83880 ASSAY OF NATRIURETIC PEPTIDE: CPT

## 2018-11-05 PROCEDURE — 99284 EMERGENCY DEPT VISIT MOD MDM: CPT

## 2018-11-05 PROCEDURE — 96361 HYDRATE IV INFUSION ADD-ON: CPT

## 2018-11-05 PROCEDURE — 82962 GLUCOSE BLOOD TEST: CPT

## 2018-11-05 PROCEDURE — 84484 ASSAY OF TROPONIN QUANT: CPT

## 2018-11-05 PROCEDURE — 74011250636 HC RX REV CODE- 250/636: Performed by: EMERGENCY MEDICINE

## 2018-11-05 PROCEDURE — 71101 X-RAY EXAM UNILAT RIBS/CHEST: CPT

## 2018-11-05 PROCEDURE — 96360 HYDRATION IV INFUSION INIT: CPT

## 2018-11-05 RX ORDER — AMLODIPINE BESYLATE 5 MG/1
5 TABLET ORAL DAILY
Qty: 30 TAB | Refills: 0 | Status: SHIPPED | OUTPATIENT
Start: 2018-11-05

## 2018-11-05 RX ORDER — LIDOCAINE 50 MG/G
PATCH TOPICAL
Qty: 1 EACH | Refills: 0 | Status: SHIPPED | OUTPATIENT
Start: 2018-11-05

## 2018-11-05 RX ORDER — INSULIN LISPRO 100 [IU]/ML
3 INJECTION, SOLUTION INTRAVENOUS; SUBCUTANEOUS
Status: COMPLETED | OUTPATIENT
Start: 2018-11-05 | End: 2018-11-05

## 2018-11-05 RX ORDER — HYDROCODONE BITARTRATE AND ACETAMINOPHEN 5; 325 MG/1; MG/1
2 TABLET ORAL
Status: COMPLETED | OUTPATIENT
Start: 2018-11-05 | End: 2018-11-05

## 2018-11-05 RX ORDER — AMLODIPINE BESYLATE 5 MG/1
5 TABLET ORAL
Status: COMPLETED | OUTPATIENT
Start: 2018-11-05 | End: 2018-11-05

## 2018-11-05 RX ADMIN — HYDROCODONE BITARTRATE AND ACETAMINOPHEN 2 TABLET: 5; 325 TABLET ORAL at 11:10

## 2018-11-05 RX ADMIN — INSULIN LISPRO 3 UNITS: 100 INJECTION, SOLUTION INTRAVENOUS; SUBCUTANEOUS at 13:21

## 2018-11-05 RX ADMIN — AMLODIPINE BESYLATE 5 MG: 5 TABLET ORAL at 13:22

## 2018-11-05 RX ADMIN — SODIUM CHLORIDE 1000 ML: 900 INJECTION, SOLUTION INTRAVENOUS at 12:05

## 2018-11-05 NOTE — DISCHARGE INSTRUCTIONS
Chest Contusion: Care Instructions  Your Care Instructions  A chest contusion, or bruise, is caused by a fall or direct blow to the chest. Car crashes, falls, getting punched, and injury from bicycle handlebars are common causes of chest contusions. A very forceful blow to the chest can injure the heart or blood vessels in the chest, the lungs, the airway, the liver, or the spleen. Pain may be caused by an injury to muscles, cartilage, or ribs. Deep breathing, coughing, or sneezing can increase your pain. Lying on the injured area also can cause pain. Follow-up care is a key part of your treatment and safety. Be sure to make and go to all appointments, and call your doctor if you are having problems. It's also a good idea to know your test results and keep a list of the medicines you take. How can you care for yourself at home? · Rest and protect the injured or sore area. Stop, change, or take a break from any activity that may be causing your pain. · Put ice or a cold pack on the area for 10 to 20 minutes at a time. Put a thin cloth between the ice and your skin. · After 2 or 3 days, if your swelling is gone, apply a heating pad set on low or a warm cloth to your chest. Some doctors suggest that you go back and forth between hot and cold. Put a thin cloth between the heating pad and your skin. · Do not wrap or tape your ribs for support. This may cause you to take smaller breaths, which could increase your risk of pneumonia and lung collapse. · Ask your doctor if you can take an over-the-counter pain medicine, such as acetaminophen (Tylenol), ibuprofen (Advil, Motrin), or naproxen (Aleve). Be safe with medicines. Read and follow all instructions on the label. · Take your medicines exactly as prescribed. Call your doctor if you think you are having a problem with your medicine.   · Gentle stretching and massage may help you feel better after a few days of rest. Stretch slowly to the point just before discomfort begins, then hold the stretch for at least 15 to 30 seconds. Do this 3 or 4 times per day. · As your pain gets better, slowly return to your normal activities. Be patient, because chest bruises can take weeks or months to heal. Any increased pain may be a sign that you need to rest a while longer. When should you call for help? Call 911 anytime you think you may need emergency care. For example, call if:    · You have severe trouble breathing.     · You cough up blood.    Call your doctor now or seek immediate medical care if:    · You have belly pain.     · You are dizzy or lightheaded, or you feel like you may faint.     · You develop new symptoms with the chest pain.     · Your chest pain gets worse.     · You have a fever.     · You have some shortness of breath.     · You have a cough that brings up mucus from the lungs.    Watch closely for changes in your health, and be sure to contact your doctor if:    · Your chest pain is not improving after 1 week. Where can you learn more? Go to http://john-harlan.info/. Enter I174 in the search box to learn more about \"Chest Contusion: Care Instructions. \"  Current as of: November 20, 2017  Content Version: 11.8  © 4180-9877 [employer]. Care instructions adapted under license by [employer] (which disclaims liability or warranty for this information). If you have questions about a medical condition or this instruction, always ask your healthcare professional. [place] [address] any warranty or liability for your use of this information. Learning About High Blood Sugar  What is high blood sugar? Your body turns the food you eat into glucose (sugar), which it uses for energy. But if your body isn't able to use the sugar right away, it can build up in your blood and lead to high blood sugar.   When the amount of sugar in your blood stays too high for too much of the time, you may have diabetes. Diabetes is a disease that can cause serious health problems. The good news is that lifestyle changes may help you get your blood sugar back to normal and avoid or delay diabetes. What causes high blood sugar? Sugar (glucose) can build up in your blood if you:  · Are overweight. · Have a family history of diabetes. · Take certain medicines, such as steroids. What are the symptoms? Having high blood sugar may not cause any symptoms at all. Or it may make you feel very thirsty or very hungry. You may also urinate more often than usual, have blurry vision, or lose weight without trying. How is high blood sugar treated? You can take steps to lower your blood sugar level if you understand what makes it get higher. Your doctor may want you to learn how to test your blood sugar level at home. Then you can see how illness, stress, or different kinds of food or medicine raise or lower your blood sugar level. Other tests may be needed to see if you have diabetes. How can you prevent high blood sugar? · Watch your weight. If you're overweight, losing just a small amount of weight may help. Reducing fat around your waist is most important. · Limit the amount of calories, sweets, and unhealthy fat you eat. Ask your doctor if a dietitian can help you. A registered dietitian can help you create meal plans that fit your lifestyle. · Get at least 30 minutes of exercise on most days of the week. Exercise helps control your blood sugar. It also helps you maintain a healthy weight. Walking is a good choice. You also may want to do other activities, such as running, swimming, cycling, or playing tennis or team sports. · If your doctor prescribed medicines, take them exactly as prescribed. Call your doctor if you think you are having a problem with your medicine. You will get more details on the specific medicines your doctor prescribes. Follow-up care is a key part of your treatment and safety.  Be sure to make and go to all appointments, and call your doctor if you are having problems. It's also a good idea to know your test results and keep a list of the medicines you take. Where can you learn more? Go to http://john-harlan.info/. Enter O108 in the search box to learn more about \"Learning About High Blood Sugar. \"  Current as of: December 7, 2017  Content Version: 11.8  © 1238-4133 [employer], [employer]. Care instructions adapted under license by [employer] (which disclaims liability or warranty for this information). If you have questions about a medical condition or this instruction, always ask your healthcare professional. [address] any warranty or liability for your use of this information.

## 2018-11-05 NOTE — ED NOTES
Purposeful rounding completed: 
 
Side rails up x 1:  YES Bed in low position and wheels locked: YES Call bell within reach: YES Comfort addressed: YES Toileting needs addressed: YES Plan of care reviewed/updated with patient and or family members: YES 
IV site assessed: YES Pain assessed and addressed: YES, 10

## 2018-11-05 NOTE — ED PROVIDER NOTES
Emergency Department Treatment Report Patient: Roque Dasilva Age: 53 y.o. Sex: male YOB: 1970 Admit Date: 11/5/2018 PCP: None MRN: [id number]  CSN: [id number] Room: [place] [address] Time Dictated: 10:21 AM   
 
Chief Complaint Chief Complaint Patient presents with  Fall History of Present Illness 10:30 AM Roque Dasilva is a 53 y.o. male with h/o diabetes, is a former smoker, uses ETOH, and has no other pertinent surgical history who presents to ED upon evaluation of an acute moderate fall onset Saturday. Patient says that he fell in the bath tub on Saturday and landed on his left side. He c/o left sided rib pain and head pain. He says that it hurts to breathe. No other concerns or symptoms at this time. PCP: None Review of Systems Constitutional: No fever, chills, or weight loss Eyes: No visual symptoms. ENT: No sore throat, runny nose or ear pain. Respiratory: No cough, dyspnea or wheezing. Pain with breathing. Cardiovascular: No chest pain, pressure, palpitations, tightness or heaviness. Gastrointestinal: No vomiting or diarrhea. No abdominal pain. Genitourinary: No dysuria, frequency, or urgency. Musculoskeletal: No joint pain or swelling. Left lateral chest wall pain. Integumentary: No rashes. Neurological: No sensory or motor symptoms. Headache. Denies complaints in all other systems. Past Medical/Surgical History Past Medical History:  
Diagnosis Date  Diabetes ([facility] [address] No past surgical history on file. Social History Social History Socioeconomic History  Marital status:  Spouse name: Not on file  Number of children: Not on file  Years of education: Not on file  Highest education level: Not on file Social Needs  Financial resource strain: Not on file  Food insecurity - worry: Not on file  Food insecurity - inability: Not on file  Transportation needs - medical: Not on file  Transportation needs - non-medical: Not on file Occupational History  Not on file Tobacco Use  Smoking status: Former Smoker  Smokeless tobacco: Never Used Substance and Sexual Activity  Alcohol use: Yes Comment: soc  Drug use: Yes Types: Marijuana Comment: last used oct 27  Sexual activity: Not on file Other Topics Concern  Not on file Social History Narrative  Not on file Family History No family history on file. Home Medications Prior to Admission Medications Prescriptions Last Dose Informant Patient Reported? Taking? OTHER   Yes No  
insulin NPH (HUMULIN N) 100 unit/mL injection   Yes No  
Sig: by SubCUTAneous route once. insulin glargine (LANTUS) 100 unit/mL injection   Yes No  
Si Units by SubCUTAneous route nightly. metFORMIN (GLUCOPHAGE) 1,000 mg tablet   Yes No  
Sig: Take 1,000 mg by mouth two (2) times daily (with meals). ondansetron (ZOFRAN ODT) 4 mg disintegrating tablet   No No  
Sig: Take 1-2 tablets every 6-8 hours as needed for nausea and vomiting. Facility-Administered Medications: None Allergies No Known Allergies Physical Exam  
 
ED Triage Vitals [18 1033] ED Encounter Vitals Group BP (!) 215/173 Pulse (Heart Rate) (!) 108 Resp Rate 17 Temp 99.3 °F (37.4 °C) Temp src O2 Sat (%) 100 % Weight 175 lb Height 5' 7\" Constitutional: Patient appears well developed and well nourished. Appearance is age and situation appropriate. Bizarre affect. HEENT: Conjunctiva clear. PERRLA. Mucous membranes moist, non-erythematous. Surface of the pharynx, palate, and tongue are pink, moist and without lesions. Neck: supple, non tender, symmetrical, no masses or JVD. Respiratory: lungs clear to auscultation, nonlabored respirations. No tachypnea or accessory muscle use. Cardiovascular: heart regular rate and rhythm without murmur rubs or gallops. Calves soft and non-tender. Distal pulses 2+ and equal bilaterally. No peripheral edema. Gastrointestinal:  Abdomen soft, nontender without complaint of pain to palpation Musculoskeletal: Nail beds pink with prompt capillary refill. Tenderness over left lateral lower ribs. Integumentary: warm and dry without rashes or lesions Neurologic: alert and oriented, Sensation intact, motor strength equal and symmetric. No facial asymmetry or dysarthria. Diagnostic Studies Lab:  
Recent Results (from the past 12 hour(s)) GLUCOSE, POC Collection Time: 11/05/18 10:28 AM  
Result Value Ref Range Glucose (POC) 430 (HH) 70 - 110 mg/dL EKG, 12 LEAD, INITIAL Collection Time: 11/05/18 10:54 AM  
Result Value Ref Range Ventricular Rate 104 BPM  
 Atrial Rate 104 BPM  
 P-R Interval 120 ms QRS Duration 80 ms  
 Q-T Interval 342 ms QTC Calculation (Bezet) 449 ms Calculated P Axis 75 degrees Calculated R Axis 12 degrees Calculated T Axis 54 degrees Diagnosis Sinus tachycardia Biatrial enlargement RSR' or QR pattern in V1 suggests right ventricular conduction delay Abnormal ECG No previous ECGs available Confirmed by Ewa Solitario ([id number]) on 11/5/2018 11:49:24 AM 
  
CBC WITH AUTOMATED DIFF Collection Time: 11/05/18 11:02 AM  
Result Value Ref Range WBC 9.7 4.6 - 13.2 K/uL  
 RBC 4.72 4.70 - 5.50 M/uL  
 HGB 13.5 13.0 - 16.0 g/dL HCT 39.0 36.0 - 48.0 % MCV 82.6 74.0 - 97.0 FL  
 MCH 28.6 24.0 - 34.0 PG  
 MCHC 34.6 31.0 - 37.0 g/dL  
 RDW 13.1 11.6 - 14.5 % PLATELET 689 071 - 824 K/uL MPV 10.7 9.2 - 11.8 FL  
 NEUTROPHILS 82 (H) 40 - 73 % LYMPHOCYTES 14 (L) 21 - 52 % MONOCYTES 4 3 - 10 % EOSINOPHILS 0 0 - 5 % BASOPHILS 0 0 - 2 %  
 ABS. NEUTROPHILS 7.9 1.8 - 8.0 K/UL  
 ABS. LYMPHOCYTES 1.4 0.9 - 3.6 K/UL  
 ABS. MONOCYTES 0.4 0.05 - 1.2 K/UL  
 ABS. EOSINOPHILS 0.0 0.0 - 0.4 K/UL  
 ABS. BASOPHILS 0.0 0.0 - 0.1 K/UL  
 DF AUTOMATED METABOLIC PANEL, COMPREHENSIVE Collection Time: 11/05/18 11:02 AM  
Result Value Ref Range Sodium 142 136 - 145 mmol/L Potassium 5.1 3.5 - 5.5 mmol/L Chloride 109 (H) 100 - 108 mmol/L  
 CO2 20 (L) 21 - 32 mmol/L Anion gap 13 3.0 - 18 mmol/L Glucose 431 (HH) 74 - 99 mg/dL BUN 34 (H) 7.0 - 18 MG/DL Creatinine 2.34 (H) 0.6 - 1.3 MG/DL  
 BUN/Creatinine ratio 15 12 - 20 GFR est AA 36 (L) >60 ml/min/1.73m2 GFR est non-AA 30 (L) >60 ml/min/1.73m2 Calcium 9.4 8.5 - 10.1 MG/DL Bilirubin, total 0.6 0.2 - 1.0 MG/DL  
 ALT (SGPT) 27 16 - 61 U/L  
 AST (SGOT) 20 15 - 37 U/L Alk. phosphatase 135 (H) 45 - 117 U/L Protein, total 7.0 6.4 - 8.2 g/dL Albumin 3.3 (L) 3.4 - 5.0 g/dL Globulin 3.7 2.0 - 4.0 g/dL A-G Ratio 0.9 0.8 - 1.7 NT-PRO BNP Collection Time: 11/05/18 11:02 AM  
Result Value Ref Range NT pro- (H) 0 - 450 PG/ML  
TROPONIN I Collection Time: 11/05/18 11:02 AM  
Result Value Ref Range Troponin-I, Qt. 0.03 0.0 - 0.045 NG/ML  
 
 
EKG: 
10:54AM 
Sinus tachycardia, 104 bpm. Right ventricular conduction delay. No STEMI. Imaging:   
Xr Ribs Lt W Pa Cxr Min 3 V Result Date: 11/5/2018 Examination: Left RIBS with PA chest. HISTORY: Left-sided rib pain after fall in bathtub. FINDINGS: PA projection of the chest as well as AP and oblique views of the left ribs obtained. No focal pneumonic consolidation. No pneumothorax or pleural effusion. Cardiac size and mediastinal contours are normal. No displaced left-sided rib fracture is demonstrated. IMPRESSION: 1. No acute radiographic cardiopulmonary abnormality. 2. No displaced left-sided rib fracture. ED Course/Medical Decision Making Patient appears well. His x-ray does not show any rib fractures or pneumothorax. Satting 100%. Spoke to him about rib contusion instructions. He is also hyperglycemic without an anion gap.  Provided him with IVF and his sliding scale Humalog. He did not take his morning BP or DM meds. Provided him with a script for Norvas and have him f/u with his PMD at the [place]. Medications  
sodium chloride 0.9 % bolus infusion 1,000 mL (not administered) HYDROcodone-acetaminophen (NORCO) 5-325 mg per tablet 2 Tab (2 Tabs Oral Given 11/5/18 1110) Final Diagnosis ICD-10-CM ICD-9-CM 1. Rib contusion, left, initial encounter S20.212A 922.1 2. Hyperglycemia R73.9 790.29 3. YESICA (acute kidney injury) ([facility] [address] N17.9 584.9 Disposition Patient is discharged home in stable condition. Advised to follow with their primary care physician. Patient advised to return to the ER for any new or worsening symptoms. My Medications CONTINUE taking these medications Instructions Each Dose to Equal Morning Noon Evening Bedtime HumuLIN N NPH U-100 Insulin 100 unit/mL injection Generic drug:  insulin NPH Your last dose was: Your next dose is:   
 
  
 by SubCUTAneous route once. LANTUS U-100 INSULIN 100 unit/mL injection Generic drug:  insulin glargine Your last dose was: Your next dose is:   
 
  
 22 Units by SubCUTAneous route nightly. 22 Units 
  
  
  
  
  
metFORMIN 1,000 mg tablet Commonly known as:  GLUCOPHAGE Your last dose was: Your next dose is: Take 1,000 mg by mouth two (2) times daily (with meals). 1000 mg 
  
  
  
  
  
ondansetron 4 mg disintegrating tablet Commonly known as:  ZOFRAN ODT Your last dose was: Your next dose is: Take 1-2 tablets every 6-8 hours as needed for nausea and vomiting. OTHER Your last dose was: Your next dose is:   
 
  
    
  
  
  
  
  
 
 
Mark Farmer MD 
November 5, 2018 My signature above authenticates this document and my orders, the final   
diagnosis (es), discharge prescription (s), and instructions in the Epic   
record. If you have any questions please contact [phone]. 
  
Nursing notes have been reviewed by the physician/ advanced practice   
Clinician. Scribe Attestation Tanya Dietrich acting as a scribe for and in the presence of Coco Cheng MD     
November 05, 2018 at 10:47 AM 
    
Provider Attestation:     
I personally performed the services described in the documentation, reviewed the documentation, as recorded by the scribe in my presence, and it accurately and completely records my words and actions.  November 05, 2018 at 10:47 AM - Coco Cheng MD

## 2018-11-05 NOTE — ED NOTES
I have reviewed discharge instructions with the patient. The patient verbalized understanding. Patient armband removed and shredded. Assisted patient to wheelchair and out of care area. Patient stated his brother is going to take him home